# Patient Record
(demographics unavailable — no encounter records)

---

## 2025-05-22 NOTE — ASSESSMENT
[Vaccines Reviewed] : Immunizations reviewed today. Please see immunization details in the vaccine log within the immunization flowsheet.  [FreeTextEntry1] : 37 y/o male with PMH of mild CAD (Cath 2020), TREY on CPAP, Hemorrhoids, LBP, Anxiety, Depression, and OCD presenting for his CPE. Denies any recent illness. No recent hospitalizations/none in past year. Feeling well today aside from stress.  #MDD, Anxiety, OCD - Reports stress, stating he is "trying to get to the point where he is fine." States he follows with a nurse practioner. States his depression and OCD comes in waves. States still feels need to do rituals. Used to be enrolled in exposure therapy, but felt it was not helping him. States he no longer sees a therapist, only the psychiatry team - PHQ-9: 18: denies any suicidal ideation and plans of harming himself. States every few weeks he wakes up with the thought that he wished he had not woken up. No firearms at home. No history of suicide attempts or self-harm. - States he drinks max 3 drinks a week (not at one time). States he drinks 6 0r more drinks roughly every 3 months.  - Fluoxetine 80mg QD - Agreeable to referral to  team. Team messaged.   #TREY - Reports nightly use of a CPAP. Does not follow up with a pulmonologist. States CPAP was originally prescribed by pulm at [facility].  -- Will refer to pulm for follow up.  #CAD - On ASA 81mg QD, Rosuvastatin 20mg QD - Reports midsternal chest pain when exercising outside on a hot summer day (lifting heavy objects, long bike ride, long workout), resulting in him having to take a break. Pain resolved with rest. These episodes are the same frequency as previously. Denies any chest pain at rest. - ECG: Sinus pao 53, PVCs, QTC ~400 - Has follow up with cards on July 2, 20225.    #Lightheadedness - Reports lightheadedness upon standing  - Sitting /66; Standing 98/68 - Reports he limits his salt intake iso CAD and drinks only tea and coffee. Recommended liberalizing his salt content and drinking more water (at least 20 oz a day).   #HCM  - Will check CBC, CMP, A1c, lipid profile - TDaP 8/21  - COVID-19 vaccine(s): 2 vaccines - Agreed to Hep B and C testing - SDOH Screen negative  Case d/w Dr. Meier   RTC in 6 months   Amilcar David MD PGY3 Internal Medicine

## 2025-05-22 NOTE — HISTORY OF PRESENT ILLNESS
[FreeTextEntry1] : Yearly checkup [de-identified] : 39 y/o male with PMH of mild CAD (Cath 2020), TREY on CPAP, Hemorrhoids, LBP, Anxiety, Depression, and OCD presenting for his CPE. Denies any recent illness. No recent hospitalizations/none in past year. Feeling well today aside from stress.  #Meds: - Fluoxetine 80mg QD - ASA 81mg QD - Rosuvastatin 20mg Qhs  #SHx:  - Lives with brother and father. Currently unemployed, on disability ([employer]). No drug use.  - States he drinks max 3 drinks a week (not at one time). States he drinks 6 or more drinks roughly every 3 months.  - Former smoker - quit 3/2021, smoked for 12 years, 1-2 packs per day - Not sexually active. Declined STI testing.   #MDD, Anxiety, OCD - Reports stress, stating he is "trying to get to the point where he is fine." States he follows with a nurse practioner. States his depression and OCD comes in waves. States still feels need to do rituals. Used to be enrolled in exposure therapy, but felt it was not helping him. States he no longer sees a therapist, only the psychiatry team - PHQ-9: 18: denies any suicidal ideation and plans of harming himself. States every few weeks he wakes up with the thought that he wished he had not woken up. No firearms at home. No history of suicide attempts or self-harm. - States he drinks max 3 drinks a week (not at one time). States he drinks 6 0r more drinks roughly every 3 months.  - Fluoxetine 80mg QD - Agreeable to referral to  team.  #TREY - Reports nightly use of a CPAP. Does not follow up with a pulmonologist. States CPAP was originally prescribed by pulm at [facility].   #CAD - On ASA 81mg QD, Rosuvastatin 20mg QD - Reports midsternal chest pain when exercising outside on a hot summer day (lifting heavy objects, long bike ride, long workout), resulting in him having to take a break. Pain resolved with rest. These episodes are the same frequency as previously. Denies any chest pain at rest. - Has follow up with cards on July 2, 20225.    #Lightheadedness - Reports lightheadedness upon standing   #HCM  - TDaP 8/21  - COVID-19 vaccine(s): 2 vaccines - Agreed to Hep B and C testing - SDOH Screen negative

## 2025-05-22 NOTE — REVIEW OF SYSTEMS
[Chest Pain] : chest pain [Anxiety] : anxiety [Depression] : depression [Negative] : Heme/Lymph [Suicidal] : not suicidal

## 2025-05-22 NOTE — PHYSICAL EXAM
[No Carotid Bruits] : no carotid bruits [No Abdominal Bruit] : a ~M bruit was not heard ~T in the abdomen [No Edema] : there was no peripheral edema [No Palpable Aorta] : no palpable aorta [Normal] : normal gait, coordination grossly intact, no focal deficits and deep tendon reflexes were 2+ and symmetric [Speech Grossly Normal] : speech grossly normal [Memory Grossly Normal] : memory grossly normal [Alert and Oriented x3] : oriented to person, place, and time [Normal Mood] : the mood was normal [Normal Insight/Judgement] : insight and judgment were intact

## 2025-07-03 NOTE — ADDENDUM
[FreeTextEntry1] :  Documented by Bessie Chahal acting as scribe for Dr. Simons 07/02/2025  All medical record entries made by the scribe were at my, Dr. Simons, direction and personally dictated by me on 07/02/2025. I have reviewed the chart and agree that the record accurately reflects my personal performance of the history, physical exam, assessment and plan. I have also personally directed, reviewed, and agreed with the chart.

## 2025-07-03 NOTE — ASSESSMENT
[FreeTextEntry1] : Mild CAD: no symptoms continue with aspirin and atorvastatin his last LDL was 17 HDL 59 triglycerides 27, A1c 5.3    Excessive alcohol use: I have advised the patient to abstain from heavy alcohol use. Patient understands the risk associated with excessive alcohol  I will be ordering a stress test and an echocardiogram for Mr. Hurley's symptoms, I will follow up with him after testing is done and will reevaluate his SOB after the summer, as the hot weather might be affecting his breathing.

## 2025-07-03 NOTE — HISTORY OF PRESENT ILLNESS
[FreeTextEntry1] : 37-year-old male history of mild CAD, in addition patient has history of alcohol use; drinks 1-3 beers a week, no smoking or drug use. Patient states he's recently been experiencing SOB when walking and climbing stairs, he believes it's due to the hot weather and finds relief when sitting. He's been staying active with bike riding, doing 1.5 miles a day.  Patient is compliant with their medications.

## 2025-07-08 NOTE — ASSESSMENT
[Sleep-related hallucinations (R44.1)] : with diffuse mesangiogapillary glomerluonephritis [FreeTextEntry1] : ATTENDING ATTESTATION 38-year-old male with TREY on CPAP 4-18 with mean pressure of 6 and average peak pressure of 10.3 presents to establish care.   His compliance is excellent (91.1% of nights >4hrs) with his therapy and his AHI is 5.7 likely because he spends 3hrs and 41 min with large unintentional leak.  He feels he is deriving significant benefit from PAP therapy.  In light of his recent significant weight loss of 100 lbs, he is amenable to being re-studied with an HST.  He should hold his PAP for three nights prior to the HST and was advised to take precautions against drowsy driving or dangerous activities while holding his CPAP.  He should follow up for results of the HST and then yearly follow up thereafter.  His current Reddy DS2 serial # [id number] is four years old and will be re-evaluated for replacement in 2026.  He uses a [employer] Vitera FFM size large, and he is comfortable with this mask.  He states he receives his supplies regularly from [facility] and changes the mask cushion monthly.  He can undergo mask fitting in our office if he continues to have a large leak.  With regard to his urges to move his legs, this isn't bothering him or causing symptoms of DIS/DMS.  We will monitor this unless it becomes problematic for him.

## 2025-07-08 NOTE — END OF VISIT
[FreeTextEntry3] : I, Dr. Adriana Jon personally performed the evaluation and management (E/M) services for this new patient.  That E/M includes conducting the initial examination, assessing all conditions, and establishing the plan of care.  Today, Angel Perez ACP was here to observe my evaluation and management services for this patient to be followed going forward.  Patient with history of severe TREY diagnosed in the past, has since been on APAP therapy (2021) with normalization of AHI.  He lost about 100 pounds and feels that he sleeps better with PAP.  I recommended home sleep test off of Pap to confirm severity of TREY if still present after significant weight loss; hold PAP therapy for 2 to 3 days prior to performing HST.  He will consider it.  Continue PAP therapy, follow-up annually.  45 minutes time spent for patient education related to comorbidities and medications, medical records/labs/radiology reviews, preventative care, documentation, coordination of care.

## 2025-07-08 NOTE — HISTORY OF PRESENT ILLNESS
[Obstructive Sleep Apnea] : obstructive sleep apnea [Witnessed Apneas] : witnessed sleep apnea [Awakening With Dry Mouth] : awakening with dry mouth [Unusual Sleep Behavior] : unusual sleep behavior [Lower Extremity Discomfort] : lower extremity discomfort in evening or at bedtime [To Bed: ___] : ~he/she~ goes to bed at [unfilled] [Arises: ___] : arises at [unfilled] [Sleep Onset Latency: ___ minutes] : sleep onset latency of [unfilled] minutes reported [Nocturnal Awakenings: ___] : ~he/she~ typically has [unfilled] nocturnal awakenings [WASO: ___] : Wake time after sleep onset is [unfilled] [TST: ___] : Total sleep time is [unfilled] [Daytime Sleep: ___] : daytime sleep: [unfilled] [Date: ___] : Date of most recent diagnostic polysomnogram: [unfilled] [AHI: ___ per hour] : Apnea-hypopnea index:  [unfilled] per hour [T90%: ___] : T90%: [unfilled]% [CPAP: ___ cmH2O] : CPAP: [unfilled] cmH2O [% Days used: ____] : Days used: [unfilled] % [% Days used > 4 hrs: ____] : Days used > 4 hrs: [unfilled] % [Mean nightly usage: ___ hrs] : Mean nightly usage: [unfilled] hrs [___ min(s)] : [unfilled] min(s) [Therapy based AHI: ___ /hr] : Therapy based AHI: [unfilled] / hr [Snoring] : no snoring [Frequent Nocturnal Awakening] : no nocturnal awakening [Unintentional Sleep while Active] : no unintentional sleep while active [Unintentional Sleep While Inactive] : no unintentional sleep while inactive [Awakes Unrefreshed] : does not awake unrefreshed [Awakes with Headache] : no headache upon awakening [Recent  Weight Gain] : no recent weight gain [Daytime Somnolence] : no daytime somnolence [DIS] : no DIS [DMS] : no DMS [Nocturnal Oxygen] : The patient does not use nocturnal oxygen [ESS] : 4 [FreeTextEntry1] : Patient reports that he sleeps predominantly on his Right side because of chest discomfort and palpitations that occur on his left side.  He reports occasional awakenings from dreams where he is pushing his arms against an opponent from his dream but denies sleep walking or falling out of bed.  He reports occasional hallucinations when falling asleep where he sees shadows in his peripheral vision.  He reports feeling muscle tightness in his legs and the need to move them.

## 2025-07-08 NOTE — HEALTH RISK ASSESSMENT
[Yes] : Yes [2 - 4 times a month (2 pts)] : 2-4 times a month (2 points) [1 or 2 (0 pts)] : 1 or 2 (0 points) [Less than monthly (1 pt)] : Less than monthly (1 point) [No] : In the past 12 months have you used drugs other than those required for medical reasons? No [One fall no injury in past year] : Patient reported one fall in the past year without injury [1] : 2) Feeling down, depressed, or hopeless for several days (1) [PHQ-2 Positive] : PHQ-2 Positive [1/2 of Days or More (2)] : 7.) Trouble concentrating on things, such as reading a newspaper or watching television? Half the days or more [Nearly Every Day (3)] : 8.) Moving or speaking so slowly that other people could have noticed, or the opposite, moving or speaking faster than usual? Nearly every day [Several Days (1)] : 9.) Thoughts that you would be off dead or of hurting yourself in some way? Several days [Moderately Severe] : Severity of Depression is Moderately Severe [Somewhat Difficult] : How difficult have these problems made it for you to do your work, take care of things at home, or get along with people? Somewhat difficult [PHQ-9 Positive] : PHQ-9 Positive [I have developed a follow-up plan documented below in the note.] : I have developed a follow-up plan documented below in the note. [Former] : Former [15-19] : 15-19 [< 15 Years] : < 15 Years [NO] : No [Audit-CScore] : 3 [[id number]] : 2 [[id number]] : 18

## 2025-07-08 NOTE — REVIEW OF SYSTEMS
[Anxiety] : anxiety [Negative] : Heme/Lymph [Recent Change In Weight] : ~T recent weight change [EDS: ESS=____] : daytime somnolence: ESS=[unfilled] [Recent Wt Loss (___ Lbs)] : recent [unfilled] ~Ulb weight loss [A.M. Dry Mouth] : a.m. dry mouth [Chest Pain] : chest pain [Palpitations] : palpitations [Leg Dysesthesias] : leg dysesthesias [Depression] : depression [Anxious] : anxious [Lower Extremity Discomfort] : lower extremity discomfort [LE discomfort relieved by movement] : lower extremity discomfort relieved by movement [Mild] : RLS symptom severity: mild [Unusual Sleep Behavior] : unusual sleep behavior [Hypnogogic Hallucinations] : hypnogogic hallucinations [Suicidal] : not suicidal [Nasal Congestion] : no nasal congestion [Snoring] : no snoring [Postnasal Drip] : no postnasal drip [Witnessed Apneas] : no witnessed apnea [Shortness Of Breath] : no shortness of breath [Orthopnea] : no orthopnea [Edema] : ~T edema was not present [CHF] : no congestive heart failure [Obesity] : not obese [Thyroid Disease] : no thyroid disease [Diabetes] : no diabetes  [Anemia] : no anemia [History of Iron Deficiency] : no history of iron deficiency [A.M. Headache] : no headache present upon awakening [Heartburn] : no heartburn [Nocturia] : no nocturia [Arthralgias] : no arthralgias [Fibromyalgia] : no fibromyalgia [Difficulty Initiating Sleep] : no difficulty falling asleep [Difficulty Maintaining Sleep] : no difficulty maintaining sleep [Acute Insomnia] : no acute insomnia [Chronic Insomnia] : no chronic  insomnia [Hypersomnolence] : not sleeping much more than usual [Cataplexy] :  no cataplexy [Hypnopompic Hallucinations] : no hypnopompic hallucinations [Sleep Paralysis] : no sleep paralysis [de-identified] : OCD

## 2025-07-08 NOTE — PHYSICAL EXAM
[General Appearance - Well Developed] : well developed [Normal Appearance] : normal appearance [General Appearance - Well Nourished] : well nourished [General Appearance - In No Acute Distress] : no acute distress [Normal Conjunctiva] : the conjunctiva exhibited no abnormalities [Normal Oropharynx] : normal oropharynx [I] : I [Neck Appearance] : the appearance of the neck was normal [Neck Cervical Mass (___cm)] : no neck mass was observed [Jugular Venous Distention Increased] : there was no jugular-venous distention [Apical Impulse] : the apical impulse was normal [Heart Rate And Rhythm] : heart rate was normal and rhythm regular [Heart Sounds] : normal S1 and S2 [Heart Sounds Gallop] : no gallops [Murmurs] : no murmurs [Heart Sounds Pericardial Friction Rub] : no pericardial rub [Respiration, Rhythm And Depth] : normal respiratory rhythm and effort [Auscultation Breath Sounds / Voice Sounds] : lungs were clear to auscultation bilaterally [Chest Palpation] : palpation of the chest revealed no abnormalities [Lungs Percussion] : the lungs were normal to percussion [Musculoskeletal - Swelling] : no joint swelling seen [Nail Clubbing] : no clubbing of the fingernails [Cyanosis, Localized] : no localized cyanosis [Petechial Hemorrhages (___cm)] : no petechial hemorrhages [] : no ischemic changes [Non-Pitting] : non-pitting [Skin Color & Pigmentation] : normal skin color and pigmentation [No Focal Deficits] : no focal deficits [Oriented To Time, Place, And Person] : oriented to person, place, and time [Impaired Insight] : insight and judgment were intact [Low Lying Soft Palate] : no low lying soft palate [Elongated Uvula] : no elongated uvula [Enlarged Base of the Tongue] : no enlargement of the base of the tongue [Erythema] : no erythema of the pharynx [Retrognathia] : no retrognathia [Micrognathia] : no micrognathia